# Patient Record
Sex: FEMALE | Race: WHITE | Employment: FULL TIME | URBAN - METROPOLITAN AREA
[De-identification: names, ages, dates, MRNs, and addresses within clinical notes are randomized per-mention and may not be internally consistent; named-entity substitution may affect disease eponyms.]

---

## 2017-12-26 PROBLEM — E87.6 HYPOKALEMIA: Status: ACTIVE | Noted: 2017-12-26

## 2017-12-27 PROBLEM — R07.81 RIB PAIN ON RIGHT SIDE: Status: ACTIVE | Noted: 2017-12-27

## 2017-12-27 PROBLEM — R07.9 CHEST PAIN: Status: ACTIVE | Noted: 2017-12-27

## 2018-01-03 ENCOUNTER — HOSPITAL ENCOUNTER (OUTPATIENT)
Dept: OTHER | Age: 43
Discharge: HOME | End: 2018-01-03
Attending: FAMILY MEDICINE | Admitting: FAMILY MEDICINE

## 2018-01-05 PROBLEM — K52.9 COLITIS: Status: ACTIVE | Noted: 2018-01-05

## 2018-01-11 LAB
ACQUISITION DURATION: NORMAL S
AVERAGE HEART RATE: 74 BPM
EKG DIAGNOSIS: NORMAL
HOLTER MAX HEART RATE: 133 BPM
HOOKUP DATE: NORMAL
HOOKUP TIME: NORMAL
Lab: NORMAL
MAX HEART RATE TIME/DATE: NORMAL
MIN HEART RATE TIME/DATE: NORMAL
MIN HEART RATE: 60 BPM
NUMBER OF QRS COMPLEXES: NORMAL
NUMBER OF SUPRAVENTRICULAR BEATS IN RUNS: 0
NUMBER OF SUPRAVENTRICULAR COUPLETS: 0
NUMBER OF SUPRAVENTRICULAR ECTOPICS: 0
NUMBER OF SUPRAVENTRICULAR ISOLATED BEATS: 0
NUMBER OF SUPRAVENTRICULAR RUNS: 0
NUMBER OF VENTRICULAR BEATS IN RUNS: 0
NUMBER OF VENTRICULAR BIGEMINAL CYCLES: 0
NUMBER OF VENTRICULAR COUPLETS: 0
NUMBER OF VENTRICULAR ECTOPICS: 0
NUMBER OF VENTRICULAR ISOLATED BEATS: 0
NUMBER OF VENTRICULAR RUNS: 0

## 2019-06-27 ENCOUNTER — HOSPITAL ENCOUNTER (OUTPATIENT)
Age: 44
Discharge: HOME | End: 2019-06-27

## 2019-06-27 ENCOUNTER — HOSPITAL ENCOUNTER (OUTPATIENT)
Dept: GENERAL RADIOLOGY | Age: 44
Discharge: HOME | End: 2019-06-27

## 2019-06-27 DIAGNOSIS — R07.9 CHEST PAIN, UNSPECIFIED TYPE: ICD-10-CM

## 2019-06-27 PROCEDURE — 71046 X-RAY EXAM CHEST 2 VIEWS: CPT

## 2019-10-20 ENCOUNTER — APPOINTMENT (OUTPATIENT)
Dept: GENERAL RADIOLOGY | Age: 44
End: 2019-10-20
Payer: COMMERCIAL

## 2019-10-20 ENCOUNTER — HOSPITAL ENCOUNTER (EMERGENCY)
Age: 44
Discharge: HOME | End: 2019-10-20
Payer: COMMERCIAL

## 2019-10-20 VITALS
HEIGHT: 63 IN | RESPIRATION RATE: 16 BRPM | OXYGEN SATURATION: 96 % | WEIGHT: 220 LBS | TEMPERATURE: 98.1 F | SYSTOLIC BLOOD PRESSURE: 132 MMHG | HEART RATE: 82 BPM | DIASTOLIC BLOOD PRESSURE: 64 MMHG | BODY MASS INDEX: 38.98 KG/M2

## 2019-10-20 DIAGNOSIS — S76.912A MUSCLE STRAIN OF LEFT THIGH, INITIAL ENCOUNTER: ICD-10-CM

## 2019-10-20 DIAGNOSIS — M25.552 LEFT HIP PAIN: Primary | ICD-10-CM

## 2019-10-20 PROCEDURE — 99283 EMERGENCY DEPT VISIT LOW MDM: CPT

## 2019-10-20 PROCEDURE — 73552 X-RAY EXAM OF FEMUR 2/>: CPT

## 2019-10-20 PROCEDURE — 73502 X-RAY EXAM HIP UNI 2-3 VIEWS: CPT

## 2019-10-20 RX ORDER — METHOCARBAMOL 500 MG/1
500 TABLET, FILM COATED ORAL 3 TIMES DAILY PRN
Qty: 20 TABLET | Refills: 0 | Status: SHIPPED | OUTPATIENT
Start: 2019-10-20 | End: 2019-10-30

## 2019-10-20 ASSESSMENT — PAIN DESCRIPTION - LOCATION
LOCATION: HIP
LOCATION: HIP

## 2019-10-20 ASSESSMENT — PAIN DESCRIPTION - ORIENTATION
ORIENTATION: LEFT
ORIENTATION: LEFT

## 2020-06-12 ENCOUNTER — OFFICE VISIT (OUTPATIENT)
Dept: ORTHOPEDIC SURGERY | Age: 45
End: 2020-06-12
Payer: COMMERCIAL

## 2020-06-12 VITALS — HEIGHT: 63 IN | RESPIRATION RATE: 16 BRPM | WEIGHT: 220 LBS | BODY MASS INDEX: 38.98 KG/M2

## 2020-06-12 PROBLEM — R20.0 NUMBNESS IN BOTH HANDS: Status: ACTIVE | Noted: 2020-06-12

## 2020-06-12 PROCEDURE — L3908 WHO COCK-UP NONMOLDE PRE OTS: HCPCS | Performed by: ORTHOPAEDIC SURGERY

## 2020-06-12 PROCEDURE — 99203 OFFICE O/P NEW LOW 30 MIN: CPT | Performed by: ORTHOPAEDIC SURGERY

## 2020-06-12 RX ORDER — DEXTROAMPHETAMINE SACCHARATE, AMPHETAMINE ASPARTATE, DEXTROAMPHETAMINE SULFATE AND AMPHETAMINE SULFATE 1.25; 1.25; 1.25; 1.25 MG/1; MG/1; MG/1; MG/1
25 TABLET ORAL
COMMUNITY
Start: 2020-05-12

## 2020-06-12 RX ORDER — PREDNISONE 20 MG/1
TABLET ORAL
COMMUNITY
Start: 2020-06-01 | End: 2022-07-03

## 2020-06-12 RX ORDER — LORAZEPAM 1 MG/1
TABLET ORAL
COMMUNITY
Start: 2020-03-17 | End: 2022-07-03

## 2020-06-12 NOTE — PROGRESS NOTES
HISTORY OF PRESENT ILLNESS: The patient is a 79-year-old right-hand-dominant female who presents today for a new hand surgery specialty evaluation regarding her right hand. She has noticed over the last year increasing episodes of numbness and tingling into the right thumb index and long fingers especially at nighttime. More recently this is been radiating up to the elbow and giving her a feeling of weakness. In addition more recently she has started to notice some early symptoms on the left side. She has been awakening at nighttime and trying a simple soft wrist wrap. She denies any specific neck pain. She states that occasionally she will feel some tingling into the right small finger. PAST MEDICAL HISTORY: Patient's medications, allergies, past medical, surgical, social and family histories were reviewed and updated as appropriate. ROS: Pertinent items are noted in HPI. Review of systems reviewed from patient history form dated on 6/18/20 and available in the patient's chart under the media tab. Denies fever, chills, confusion, bowel/bladder active change. PHYSICAL EXAMINATION: Examination reveals a pleasant individual in no acute distress. There appears to be satisfactory pain-free range of motion, strength, and stability of the cervical spine, shoulders, and elbows. Skin is intact without lymphadenopathy, discoloration, or abnormal temperature. There is intact, symmetric circulation in both upper extremities. Wrist, hand, and digital range of motion is satisfactory bilaterally. Provocative testing for cubital tunnel syndrome appears to be slightly positive on the right side but without the same discomfort as the wrist.    Provocative testing for bilateral carpal tunnel syndrome suggests reproduction of symptoms with direct compression, Phalen's, and Tinel's.   Symptoms are much more noted on the right side but also slightly positive on the left there is no sign of circulatory dysfunction or atrophy. DIAGNOSTIC TESTING:        IMPRESSION AND PLAN: Bilateral hand numbness, worse on the right, with strong suspicion of carpal tunnel syndrome but an outside concern for a possible element of ulnar neuropathy as well. We will empirically start her on carpal tunnel bracing at nighttime and provide her with at least a right carpal tunnel brace. In addition we will get her set up for EMG testing of both upper extremities. I will see her back after the testing to review the response to bracing and discuss the findings of the EMG. Please note that this transcription was created using voice recognition software. Any errors are unintentional and may be due to voice recognition transcription. Procedures    Shi Marcos Gel Wrist Brace     Patient was prescribed a Shi Marcos Gel Wrist Brace. The right wrist will require stabilization / immobilization from this semi-rigid / rigid orthosis to improve their function. The orthosis will assist in protecting the affected area, provide functional support and facilitate healing. The patient was educated and fit by a healthcare professional with expert knowledge and specialization in brace application while under the direct supervision of the treating physician. Verbal and written instructions for the use of and application of this item were provided. They were instructed to contact the office immediately should the brace result in increased pain, decreased sensation, increased swelling or worsening of the condition.  Who cock-up nonmolde pre ots     Patient was prescribed a Shi Marcos Gel Wrist Brace. The left wrist will require stabilization / immobilization from this semi-rigid / rigid orthosis to improve their function. The orthosis will assist in protecting the affected area, provide functional support and facilitate healing.     The patient was educated and fit by a healthcare professional with expert knowledge and specialization

## 2020-07-02 ENCOUNTER — OFFICE VISIT (OUTPATIENT)
Dept: ORTHOPEDIC SURGERY | Age: 45
End: 2020-07-02
Payer: COMMERCIAL

## 2020-07-02 VITALS — WEIGHT: 220 LBS | HEIGHT: 63 IN | BODY MASS INDEX: 38.98 KG/M2

## 2020-07-02 PROBLEM — G56.03 CARPAL TUNNEL SYNDROME, BILATERAL: Status: ACTIVE | Noted: 2020-07-02

## 2020-07-02 PROCEDURE — 99214 OFFICE O/P EST MOD 30 MIN: CPT | Performed by: ORTHOPAEDIC SURGERY

## 2020-07-02 PROCEDURE — 95886 MUSC TEST DONE W/N TEST COMP: CPT | Performed by: PHYSICAL MEDICINE & REHABILITATION

## 2020-07-02 PROCEDURE — 95910 NRV CNDJ TEST 7-8 STUDIES: CPT | Performed by: PHYSICAL MEDICINE & REHABILITATION

## 2020-07-02 NOTE — PROGRESS NOTES
discussed the risks, benefits, limitations, alternatives, and postop recovery following the proposed procedure. We will begin the process of scheduling surgery if there are no other preoperative medical contraindications. Please note that this transcription was created using voice recognition software. Any errors are unintentional and may be due to voice recognition transcription.

## 2020-07-02 NOTE — PROGRESS NOTES
[address] [facility], [address]   Ph: [phone]  NCS & Electromyography Report        Full Name: Rony Galvez Gender: Female  Patient ID: [id number] YOB: 1975      Visit Date: 7/2/2020 09:46  Age: 39 Years 11 Months Old  Examining Physician: Dr Kiki Reed  Referring Physician: Dr Jaci Pacheco  Patient History: yovani hand numbness      Sensory NCS      Nerve / Sites Rec. Site Onset Lat Peak Lat PP Amp Segments Distance Peak Diff Velocity     ms ms µV  cm ms m/s   R Median - D2 Ulnar D5      Median Dig II 4.69 5.78 11.9 Median - Dig II 14  30      Ref. ?3.70 ? 20.0 Ref. ?53      Ulnar Dig V 2.76 3.49 38.3 Ulnar - Median 14 -2.29 73      Ref. ?3.50 ? 10.0 Ref. ?53   L Median - D2 Ulnar D5      Median Dig II 3.65 4.79 18.0 Median - Dig II 14  38      Ref. ?3.70 ? 20.0 Ref. ?53      Ulnar Dig V 2.60 3.39 21.7 Ulnar - Median 14 -1.41 134      Ref. ?3.50 ? 10.0 Ref. ?53       Motor NCS      Nerve / Sites Muscle Latency Amplitude Amp % Duration Segments Distance Lat Diff Velocity     ms mV % ms  cm ms m/s   R Median - APB      Wrist APB 4.95 10.2 100 7.19 Wrist - APB 8        Ref. ?4.40 ?4.0   Ref. Elbow APB 9.43 9.0 88.5 7.66 Elbow - Wrist 23 4.48 51      Ref. Ref.   ?49   L Median - APB      Wrist APB 4.84 12.8 100 6.61 Wrist - APB 8        Ref. ?4.40 ?4.0   Ref. Elbow APB 8.96 12.3 96.4 6.51 Elbow - Wrist 22 4.11 53      Ref. Ref.   ?49   R Ulnar - ADM      Wrist ADM 2.81 6.3 100 7.60 Wrist - ADM 8        Ref. ?3.60 ?5.0   Ref. B. Elbow ADM 5.83 6.9 110 7.40 B. Elbow - Wrist 20 3.02 66      Ref. Ref.   ?49      A. Elbow ADM 7.50 7.1 112 7.60 A. Elbow - B. Elbow 10 1.67 60      Ref. Ref.   ?49   L Ulnar - ADM      Wrist ADM 2.86 8.4 100 6.87 Wrist - ADM 8        Ref. ?3.60 ?5.0   Ref. B. Elbow ADM 5.89 8.6 101 7.08 B. Elbow - Wrist 20 3.02 66      Ref. Ref.   ?49      A. Elbow ADM 7.24 8.7 103 7.14 A. Elbow - B. Elbow 10 1.35 74      Ref. Ref.   ?49       EMG Summary Table     Spontaneous MUAP Recruitment   Muscle Nerve Roots IA Fib PSW Fasc H.F. Amp Dur. PPP Pattern   R. First dorsal interosseous Ulnar C8-T1 N None None None None N N N N   R. Abductor pollicis brevis Median C1-I9 N None None None None 1+ 2+ 1+ N   L. Abductor pollicis brevis Median K9-X1 N None None None None 1+ 1+ N N   L. First dorsal interosseous Ulnar C8-T1 N None None None None N N N N   L. Triceps brachii Radial C6-C8 N None None None None N N N N   R. Triceps brachii Radial C6-C8 N None None None None N N N N   L. Biceps brachii Musculocutaneous C5-C6 N None None None None N N N N   R. Biceps brachii Musculocutaneous C5-C6 N None None None None N N N N   L. Pronator teres Median C6-C7 N None None None None N N N N   R. Pronator teres Median C6-C7 N None None None None N N N N   L. Extensor digitorum communis Radial C7-C8 N None None None None N N N N   R. Extensor digitorum communis Radial C7-C8 N None None None None N N N N         Summary: Bilateral median SNAP and CMAP latencies are slowed across the wrists with low bilateral sensory amplitudes. Monopolar exam reveals chronic neurogenic motor unit morphology isolated to bilateral APB muscles. The remainder of the NCS and monopolar exam are normal.        Conclusion:  Abnormal examination. There is electrodiagnostic evidence of:    1. Moderate chronic bilateral carpal tunnel syndrome  2.  No evidence of any other left or right upper extremity mononeuropathy, plexopathy, or radiculopathy              Talat Bahena MD    Board Certified Physical Medicine and Rehabilitation

## 2020-08-17 ENCOUNTER — TELEPHONE (OUTPATIENT)
Dept: ORTHOPEDIC SURGERY | Age: 45
End: 2020-08-17

## 2022-06-08 ENCOUNTER — HOSPITAL ENCOUNTER (OUTPATIENT)
Dept: CT IMAGING | Age: 47
Discharge: HOME | End: 2022-06-08

## 2022-06-08 DIAGNOSIS — S22.41XG CLOSED FRACTURE OF MULTIPLE RIBS OF RIGHT SIDE WITH DELAYED HEALING, SUBSEQUENT ENCOUNTER: ICD-10-CM

## 2022-06-08 PROCEDURE — 71250 CT THORAX DX C-: CPT

## 2022-06-27 ENCOUNTER — TELEPHONE (OUTPATIENT)
Dept: ORTHOPEDIC SURGERY | Age: 47
End: 2022-06-27

## 2022-07-03 ENCOUNTER — APPOINTMENT (OUTPATIENT)
Dept: GENERAL RADIOLOGY | Age: 47
End: 2022-07-03

## 2022-07-03 ENCOUNTER — HOSPITAL ENCOUNTER (EMERGENCY)
Age: 47
Discharge: HOME | End: 2022-07-03
Attending: EMERGENCY MEDICINE

## 2022-07-03 VITALS
RESPIRATION RATE: 14 BRPM | BODY MASS INDEX: 38.09 KG/M2 | WEIGHT: 215 LBS | DIASTOLIC BLOOD PRESSURE: 89 MMHG | HEIGHT: 63 IN | OXYGEN SATURATION: 100 % | SYSTOLIC BLOOD PRESSURE: 172 MMHG | TEMPERATURE: 98 F | HEART RATE: 98 BPM

## 2022-07-03 DIAGNOSIS — S22.41XK: ICD-10-CM

## 2022-07-03 DIAGNOSIS — R07.81 RIB PAIN: Primary | ICD-10-CM

## 2022-07-03 DIAGNOSIS — R03.0 ELEVATED BLOOD PRESSURE READING: ICD-10-CM

## 2022-07-03 PROCEDURE — 99284 EMERGENCY DEPT VISIT MOD MDM: CPT

## 2022-07-03 PROCEDURE — 96372 THER/PROPH/DIAG INJ SC/IM: CPT

## 2022-07-03 PROCEDURE — 71101 X-RAY EXAM UNILAT RIBS/CHEST: CPT

## 2022-07-03 PROCEDURE — 6360000002 HC RX W HCPCS: Performed by: EMERGENCY MEDICINE

## 2022-07-03 RX ORDER — DEXAMETHASONE SODIUM PHOSPHATE 10 MG/ML
10 INJECTION, SOLUTION INTRAMUSCULAR; INTRAVENOUS ONCE
Status: COMPLETED | OUTPATIENT
Start: 2022-07-03 | End: 2022-07-03

## 2022-07-03 RX ORDER — KETOROLAC TROMETHAMINE 30 MG/ML
15 INJECTION, SOLUTION INTRAMUSCULAR; INTRAVENOUS ONCE
Status: COMPLETED | OUTPATIENT
Start: 2022-07-03 | End: 2022-07-03

## 2022-07-03 RX ORDER — SULINDAC 200 MG/1
200 TABLET ORAL 2 TIMES DAILY
COMMUNITY

## 2022-07-03 RX ORDER — HYDROXYCHLOROQUINE SULFATE 200 MG/1
TABLET, FILM COATED ORAL DAILY
COMMUNITY

## 2022-07-03 RX ADMIN — KETOROLAC TROMETHAMINE 15 MG: 30 INJECTION, SOLUTION INTRAMUSCULAR; INTRAVENOUS at 22:20

## 2022-07-03 RX ADMIN — DEXAMETHASONE SODIUM PHOSPHATE 10 MG: 10 INJECTION, SOLUTION INTRAMUSCULAR; INTRAVENOUS at 22:20

## 2022-07-03 ASSESSMENT — PAIN DESCRIPTION - PAIN TYPE: TYPE: ACUTE PAIN;CHRONIC PAIN

## 2022-07-03 ASSESSMENT — PAIN SCALES - GENERAL
PAINLEVEL_OUTOF10: 7
PAINLEVEL_OUTOF10: 5
PAINLEVEL_OUTOF10: 6

## 2022-07-03 ASSESSMENT — PAIN - FUNCTIONAL ASSESSMENT
PAIN_FUNCTIONAL_ASSESSMENT: PREVENTS OR INTERFERES SOME ACTIVE ACTIVITIES AND ADLS
PAIN_FUNCTIONAL_ASSESSMENT: 0-10
PAIN_FUNCTIONAL_ASSESSMENT: 0-10

## 2022-07-03 ASSESSMENT — PAIN DESCRIPTION - DESCRIPTORS: DESCRIPTORS: SHARP

## 2022-07-03 ASSESSMENT — PAIN DESCRIPTION - ORIENTATION: ORIENTATION: RIGHT;LEFT

## 2022-07-03 ASSESSMENT — PAIN DESCRIPTION - FREQUENCY: FREQUENCY: CONTINUOUS

## 2022-07-03 ASSESSMENT — PAIN DESCRIPTION - LOCATION: LOCATION: RIB CAGE

## 2022-07-04 NOTE — ED TRIAGE NOTES
Chronic rib pain pt has been seen and treated for last week, will have f/u with ortho surgeon soon. C/o bilateral pain making it hard to take deep breaths.

## 2022-07-04 NOTE — ED PROVIDER NOTES
Emergency Department Physician Note     Location: [address] EMERGENCY DEPARTMENT  7/3/2022    CHIEF COMPLAINT  Rib Pain      HISTORY OF PRESENT ILLNESS  Trisha Mosqueda is a 52 y.o. female presents to the ED with rib pain, typically has right-sided rib pain ever since an injury last year, told she had rib fractures about 6 months later, but was unaware, has been having issues ever since, painful with cough or breathing, now the pain is radiating around to the left side as well, feels like it is a tight band around her rib cage, was seen and evaluated for this last week, there is supposed to have her follow-up with Ortho, but they are trying to get her in with the right doctor since Dr. Blaine Nguyen would not manage nonhealing rib fractures like this, no rashes nor flank/abdominal pain. Declines concern for pregnancy, no nausea/vomiting, no bowel or bladder changes, no hematuria, no significant cough/productive sputum, no fever, no known sick contacts, she did recently get diagnosed with RA, has been started on Plaquenil and sulindac, she had seen a chiropractor for this as well without much relief, no other complaints, modifying factors or associated symptoms. I have reviewed the following from the nursing documentation. Past Medical History:   Diagnosis Date    Anxiety     Fibromyalgia     Headache(784.0)     Hypertension      Past Surgical History:   Procedure Laterality Date     SECTION      OTHER SURGICAL HISTORY Bilateral 10/12/2016    removal bilateral PE tubes with bilateral paper patch     TONSILLECTOMY      TUBAL LIGATION      TYMPANOSTOMY TUBE PLACEMENT      Tubes still in ears, put in when 8 yrs old approx.      Family History   Problem Relation Age of Onset    High Blood Pressure Father     Heart Disease Father     Asthma Father      Social History     Socioeconomic History    Marital status:      Spouse name: Not on file    Number of children: Not on file    Years of education: Not on file    Highest education level: Not on file   Occupational History    Not on file   Tobacco Use    Smoking status: Never Smoker    Smokeless tobacco: Never Used   Substance and Sexual Activity    Alcohol use: No    Drug use: No    Sexual activity: Yes     Partners: Male   Other Topics Concern    Not on file   Social History Narrative    Not on file     Social Determinants of Health     Financial Resource Strain:     Difficulty of Paying Living Expenses: Not on file   Food Insecurity:     Worried About Running Out of Food in the Last Year: Not on file    Sandrine of Food in the Last Year: Not on file   Transportation Needs:     Lack of Transportation (Medical): Not on file    Lack of Transportation (Non-Medical): Not on file   Physical Activity:     Days of Exercise per Week: Not on file    Minutes of Exercise per Session: Not on file   Stress:     Feeling of Stress : Not on file   Social Connections:     Frequency of Communication with Friends and Family: Not on file    Frequency of Social Gatherings with Friends and Family: Not on file    Attends Jain Services: Not on file    Active Member of [address] [place] [address] or Organizations: Not on file    Attends Club or Organization Meetings: Not on file    Marital Status: Not on file   Intimate Partner Violence:     Fear of Current or Ex-Partner: Not on file    Emotionally Abused: Not on file    Physically Abused: Not on file    Sexually Abused: Not on file   Housing Stability:     Unable to Pay for Housing in the Last Year: Not on file    Number of [place] in the Last Year: Not on file    Unstable Housing in the Last Year: Not on file     No current facility-administered medications for this encounter.      Current Outpatient Medications   Medication Sig Dispense Refill    hydroxychloroquine (PLAQUENIL) 200 MG tablet Take by mouth daily      sulindac (CLINORIL) 200 MG tablet Take 200 mg by mouth 2 times daily      CHEST (MIN 3 VIEWS)    Result Date: 7/3/2022  EXAMINATION: 2 XRAY VIEWS OF THE RIGHT RIBS WITH FRONTAL XRAY VIEW OF THE CHEST 7/3/2022 10:21 pm COMPARISON: CT on 06/08/2022 HISTORY: Acute right anterior rib pain and shortness of breath. Right rib fractures on CT last month. FINDINGS: Cardiomediastinal silhouette is borderline enlarged. Lungs are clear. No pleural effusion or pneumothorax. Again noted are fractures of the right 3rd through 6th ribs as seen on most recent CT. No new fracture seen. Subacute to chronic fractures of the right 3rd through 6th ribs as seen on most recent CT. No acute fracture seen. ED COURSE/MDM  Patient seen and evaluated. Old records reviewed. Labs and imaging reviewed and results discussed with patient.      52 y.o. 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, she states she had been trying to get in with an orthopedic doctor, but I explained this would not likely be the best option for her problem, potentially trauma surgery or cardiothoracic surgery, given information on discharge instructions, she had no pneumothorax/hemothorax, no acute process on x-rays today, she just had a CT scan recently so I did not feel it necessary to repeat for the same type of pain, felt better after Toradol/Decadron here, explained she may have an aspect of pleurisy as well, but after reviewing the imaging with him this is most likely bony/rib related, explained she may end up needing surgery though it is not common to have this performed, however since these are ununited for a prolonged period and causing pain, it may be of benefit, she has medications at home for pain including her sulindac and previously prescribed Norco, did not request anything else, strict return precautions given, all questions answered, will return if any worsening symptoms or new concerns, see AVS for further discharge information, patient verbalized understanding of plan, felt comfortable going home. Orders Placed This Encounter   Procedures    XR RIBS RIGHT INCLUDE CHEST (MIN 3 VIEWS)     Orders Placed This Encounter   Medications    ketorolac (TORADOL) injection 15 mg    dexamethasone (PF) (DECADRON) injection 10 mg     ED Course as of 07/04/22 0429   Sun Jul 03, 2022   5676 She is feeling a little better. [SY]      ED Course User Index  [SY] Constanza Valladares DO         CLINICAL IMPRESSION  1. Rib pain    2. Multiple fractures of ribs, right side, subsequent encounter for fracture with nonunion    3. Elevated blood pressure reading        Blood pressure (!) 172/89, pulse 98, temperature 98 °F (36.7 °C), temperature source Oral, resp. rate 14, height 5' 3\" (1.6 m), weight 215 lb (97.5 kg), SpO2 100 %, not currently breastfeeding. DISPOSITION  Mariana Gonzalez was discharged to home in stable condition.                 Constanza Valladares DO  07/06/22 9001

## 2022-12-05 ENCOUNTER — APPOINTMENT (OUTPATIENT)
Dept: CT IMAGING | Age: 47
End: 2022-12-05

## 2022-12-05 ENCOUNTER — HOSPITAL ENCOUNTER (EMERGENCY)
Age: 47
Discharge: HOME | End: 2022-12-05
Attending: EMERGENCY MEDICINE

## 2022-12-05 ENCOUNTER — APPOINTMENT (OUTPATIENT)
Dept: GENERAL RADIOLOGY | Age: 47
End: 2022-12-05

## 2022-12-05 VITALS
DIASTOLIC BLOOD PRESSURE: 94 MMHG | HEIGHT: 63 IN | RESPIRATION RATE: 16 BRPM | SYSTOLIC BLOOD PRESSURE: 170 MMHG | HEART RATE: 81 BPM | OXYGEN SATURATION: 91 % | WEIGHT: 215 LBS | TEMPERATURE: 96.2 F | BODY MASS INDEX: 38.09 KG/M2

## 2022-12-05 DIAGNOSIS — M06.9 RHEUMATOID ARTHRITIS FLARE (HCC): ICD-10-CM

## 2022-12-05 DIAGNOSIS — D84.9 IMMUNOCOMPROMISED STATE (HCC): Primary | ICD-10-CM

## 2022-12-05 DIAGNOSIS — R06.89 DYSPNEA AND RESPIRATORY ABNORMALITIES: ICD-10-CM

## 2022-12-05 DIAGNOSIS — R06.00 DYSPNEA AND RESPIRATORY ABNORMALITIES: ICD-10-CM

## 2022-12-05 LAB
A/G RATIO: 1.5 (ref 1.1–2.2)
ALBUMIN SERPL-MCNC: 4.6 G/DL (ref 3.4–5)
ALP BLD-CCNC: 95 U/L (ref 40–129)
ALT SERPL-CCNC: 19 U/L (ref 10–40)
ANION GAP SERPL CALCULATED.3IONS-SCNC: 12 MMOL/L (ref 3–16)
AST SERPL-CCNC: 14 U/L (ref 15–37)
BASOPHILS ABSOLUTE: 0 K/UL (ref 0–0.2)
BASOPHILS RELATIVE PERCENT: 0.4 %
BILIRUB SERPL-MCNC: 0.3 MG/DL (ref 0–1)
BUN BLDV-MCNC: 13 MG/DL (ref 7–20)
C-REACTIVE PROTEIN: <3 MG/L (ref 0–5.1)
CALCIUM SERPL-MCNC: 10 MG/DL (ref 8.3–10.6)
CHLORIDE BLD-SCNC: 101 MMOL/L (ref 99–110)
CO2: 28 MMOL/L (ref 21–32)
CREAT SERPL-MCNC: 0.7 MG/DL (ref 0.6–1.1)
D DIMER: 1.58 UG/ML FEU (ref 0–0.6)
EKG ATRIAL RATE: 84 BPM
EKG DIAGNOSIS: NORMAL
EKG P AXIS: 70 DEGREES
EKG P-R INTERVAL: 150 MS
EKG Q-T INTERVAL: 386 MS
EKG QRS DURATION: 78 MS
EKG QTC CALCULATION (BAZETT): 456 MS
EKG R AXIS: 42 DEGREES
EKG T AXIS: 61 DEGREES
EKG VENTRICULAR RATE: 84 BPM
EOSINOPHILS ABSOLUTE: 0 K/UL (ref 0–0.6)
EOSINOPHILS RELATIVE PERCENT: 0.5 %
GFR SERPL CREATININE-BSD FRML MDRD: >60 ML/MIN/{1.73_M2}
GLUCOSE BLD-MCNC: 99 MG/DL (ref 70–99)
HCT VFR BLD CALC: 42.9 % (ref 36–48)
HEMOGLOBIN: 14.5 G/DL (ref 12–16)
INR BLD: 0.94 (ref 0.87–1.14)
LACTIC ACID: 2.7 MMOL/L (ref 0.4–2)
LYMPHOCYTES ABSOLUTE: 1.3 K/UL (ref 1–5.1)
LYMPHOCYTES RELATIVE PERCENT: 15.4 %
MCH RBC QN AUTO: 30.2 PG (ref 26–34)
MCHC RBC AUTO-ENTMCNC: 33.8 G/DL (ref 31–36)
MCV RBC AUTO: 89.3 FL (ref 80–100)
MONOCYTES ABSOLUTE: 0.3 K/UL (ref 0–1.3)
MONOCYTES RELATIVE PERCENT: 4 %
NEUTROPHILS ABSOLUTE: 6.8 K/UL (ref 1.7–7.7)
NEUTROPHILS RELATIVE PERCENT: 79.7 %
PDW BLD-RTO: 13.2 % (ref 12.4–15.4)
PLATELET # BLD: 334 K/UL (ref 135–450)
PMV BLD AUTO: 7.8 FL (ref 5–10.5)
POTASSIUM SERPL-SCNC: 4.1 MMOL/L (ref 3.5–5.1)
PRO-BNP: 59 PG/ML (ref 0–124)
PROTHROMBIN TIME: 12.4 SEC (ref 11.7–14.5)
RAPID INFLUENZA  B AGN: NEGATIVE
RAPID INFLUENZA A AGN: NEGATIVE
RBC # BLD: 4.8 M/UL (ref 4–5.2)
SARS-COV-2, NAAT: NOT DETECTED
SODIUM BLD-SCNC: 141 MMOL/L (ref 136–145)
TOTAL PROTEIN: 7.6 G/DL (ref 6.4–8.2)
TROPONIN: <0.01 NG/ML
WBC # BLD: 8.6 K/UL (ref 4–11)

## 2022-12-05 PROCEDURE — 85379 FIBRIN DEGRADATION QUANT: CPT

## 2022-12-05 PROCEDURE — 93005 ELECTROCARDIOGRAM TRACING: CPT | Performed by: EMERGENCY MEDICINE

## 2022-12-05 PROCEDURE — 86140 C-REACTIVE PROTEIN: CPT

## 2022-12-05 PROCEDURE — 80053 COMPREHEN METABOLIC PANEL: CPT

## 2022-12-05 PROCEDURE — 87635 SARS-COV-2 COVID-19 AMP PRB: CPT

## 2022-12-05 PROCEDURE — 85610 PROTHROMBIN TIME: CPT

## 2022-12-05 PROCEDURE — 99285 EMERGENCY DEPT VISIT HI MDM: CPT

## 2022-12-05 PROCEDURE — 83605 ASSAY OF LACTIC ACID: CPT

## 2022-12-05 PROCEDURE — 71046 X-RAY EXAM CHEST 2 VIEWS: CPT

## 2022-12-05 PROCEDURE — 85025 COMPLETE CBC W/AUTO DIFF WBC: CPT

## 2022-12-05 PROCEDURE — 36415 COLL VENOUS BLD VENIPUNCTURE: CPT

## 2022-12-05 PROCEDURE — 83880 ASSAY OF NATRIURETIC PEPTIDE: CPT

## 2022-12-05 PROCEDURE — 71260 CT THORAX DX C+: CPT | Performed by: EMERGENCY MEDICINE

## 2022-12-05 PROCEDURE — 6360000004 HC RX CONTRAST MEDICATION: Performed by: EMERGENCY MEDICINE

## 2022-12-05 PROCEDURE — 87804 INFLUENZA ASSAY W/OPTIC: CPT

## 2022-12-05 PROCEDURE — 84484 ASSAY OF TROPONIN QUANT: CPT

## 2022-12-05 RX ORDER — AMOXICILLIN AND CLAVULANATE POTASSIUM 875; 125 MG/1; MG/1
1 TABLET, FILM COATED ORAL EVERY 12 HOURS
COMMUNITY
Start: 2022-12-01 | End: 2022-12-11

## 2022-12-05 RX ORDER — PREDNISONE 20 MG/1
TABLET ORAL
COMMUNITY
Start: 2022-12-01

## 2022-12-05 RX ORDER — METHOCARBAMOL 500 MG/1
500 TABLET, FILM COATED ORAL 4 TIMES DAILY
Qty: 20 TABLET | Refills: 0 | Status: SHIPPED | OUTPATIENT
Start: 2022-12-05 | End: 2022-12-10

## 2022-12-05 RX ADMIN — IOPAMIDOL 75 ML: 755 INJECTION, SOLUTION INTRAVENOUS at 10:07

## 2022-12-05 ASSESSMENT — ENCOUNTER SYMPTOMS
STRIDOR: 0
SINUS PAIN: 0
SINUS PRESSURE: 0
TROUBLE SWALLOWING: 0
WHEEZING: 0
SORE THROAT: 0
SHORTNESS OF BREATH: 1
COUGH: 1
CHOKING: 0
CHEST TIGHTNESS: 1

## 2022-12-05 NOTE — DISCHARGE INSTRUCTIONS
Add acetaminophen 650 mg every 4 hours for pain  Take Robaxin 3 times a day to relax the muscles in your back  Finish the Augmentin which she was started  Finish the prednisone tapering dose  Take your anti-inflammatories  Follow-up with both of your doctors soon

## 2022-12-05 NOTE — ED PROVIDER NOTES
[address] [facility]      Pt Name: Tangela Riley  MRN: [id number]  [place] 1975  Date of evaluation: 12/5/2022  Provider: Camron Garcia MD    [address] [place] [address]       Chief Complaint   Patient presents with    Rib Pain (injury)     Patient reports that she has been fighting a respiratory virus since last Saturday. Pt. States that she is now having bilateral rib pain/back pain that causes pain with breathing. HISTORY OF PRESENT ILLNESS   (Location/Symptom, Timing/Onset, Context/Setting, Quality, Duration, Modifying Factors, Severity)  Note limiting factors. Tangela Riley is a 52 y.o. female who presents to the emergency department     It presents with some bilateral back discomfort. Patient says that she does have exertional dyspnea she does suffer from anxiety fibromyalgia as she has had some apparently rib fractures on the right side that she thinks was due to some minor trauma they have showed up on the last several imaging studies that she has had including a CT she did have a CT angio for PE 1 year ago at [facility]. That presentation was reviewed. Patient was seen on Monday at urgent care and was given Augmentin and prednisone for presumptive cough congestion. She continues to have pain across the lower back on both the left and the right side her rib fractures were on the right side this was 2 or 3 years ago apparently  She does have rheumatoid arthritis and is on plaque canal as well as anti-inflammatories. Patient has not had any lung involvement as of yet that we are aware of. She says that her cough is dry it is nonproductive she denies any severe fever but does endorse that 2 weeks ago she had flulike symptoms as well as the other members of her house. She felt as though she is never really fully recovered. She has had COVID-19 every other month she says. Patient    The history is provided by the patient.      Nursing Notes were reviewed. REVIEW OF SYSTEMS    (2-9 systems for level 4, 10 or more for level 5)     Review of Systems   Constitutional:  Positive for activity change, appetite change and chills. Negative for diaphoresis, fatigue and fever. HENT:  Negative for congestion, hearing loss, sinus pressure, sinus pain, sneezing, sore throat and trouble swallowing. Eyes:  Negative for visual disturbance. Respiratory:  Positive for cough, chest tightness and shortness of breath. Negative for choking, wheezing and stridor. Cardiovascular:  Negative for chest pain, palpitations and leg swelling. Genitourinary:  Negative for decreased urine volume, flank pain, hematuria and urgency. Musculoskeletal:  Negative for arthralgias. Allergic/Immunologic: Positive for immunocompromised state. Neurological:  Negative for dizziness. All other systems reviewed and are negative. Except as noted above the remainder of the review of systems was reviewed and negative. PAST MEDICAL HISTORY     Past Medical History:   Diagnosis Date    Anxiety     Fibromyalgia     Headache(784.0)     Hypertension          SURGICAL HISTORY       Past Surgical History:   Procedure Laterality Date     SECTION      OTHER SURGICAL HISTORY Bilateral 10/12/2016    removal bilateral PE tubes with bilateral paper patch     TONSILLECTOMY      TUBAL LIGATION      TYMPANOSTOMY TUBE PLACEMENT      Tubes still in ears, put in when 8 yrs old approx. CURRENT MEDICATIONS       Previous Medications    AMOXICILLIN-CLAVULANATE (AUGMENTIN) 875-125 MG PER TABLET    Take 1 tablet by mouth in the morning and 1 tablet in the evening. AMPHETAMINE-DEXTROAMPHETAMINE (ADDERALL) 5 MG TABLET    Take 25 mg by mouth.      HYDROXYCHLOROQUINE (PLAQUENIL) 200 MG TABLET    Take by mouth daily    IBUPROFEN (ADVIL;MOTRIN) 400 MG TABLET    Take 400 mg by mouth every 6 hours as needed for Pain    PREDNISONE (DELTASONE) 20 MG TABLET    Take 60mg by mouth daily x3 days, then 40mg by mouth daily x3 days, then 20mg by mouth daily x3 days, then 10mg by mouth daily x3 days    SERTRALINE (ZOLOFT) 50 MG TABLET    Take 200 mg by mouth daily     SULINDAC (CLINORIL) 200 MG TABLET    Take 200 mg by mouth 2 times daily       ALLERGIES     Hydroxyzine, Lisinopril, Sumatriptan, Morphine, and Paxil [paroxetine hcl]    FAMILY HISTORY       Family History   Problem Relation Age of Onset    High Blood Pressure Father     Heart Disease Father     Asthma Father           SOCIAL HISTORY       Social History     Socioeconomic History    Marital status:      Spouse name: None    Number of children: None    Years of education: None    Highest education level: None   Tobacco Use    Smoking status: Never    Smokeless tobacco: Never   Vaping Use    Vaping Use: Never used   Substance and Sexual Activity    Alcohol use: No    Drug use: No    Sexual activity: Yes     Partners: Male       SCREENINGS    [place] Coma Scale  Eye Opening: Spontaneous  Best Verbal Response: Oriented  Best Motor Response: Obeys commands  [place] Coma Scale Score: 15          PHYSICAL EXAM    (up to 7 for level 4, 8 or more for level 5)     ED Triage Vitals [12/05/22 0733]   BP Temp Temp Source Heart Rate Resp SpO2 Height Weight   (!) 189/82 (!) 96.2 °F (35.7 °C) Oral 91 18 95 % 5' 3\" (1.6 m) 215 lb (97.5 kg)       Physical Exam  Vitals and nursing note reviewed. Constitutional:       General: She is not in acute distress. Appearance: Normal appearance. She is well-developed. She is ill-appearing. She is not toxic-appearing or diaphoretic. HENT:      Head: Normocephalic. Right Ear: Ear canal and external ear normal.      Left Ear: Ear canal and external ear normal.      Nose: Nose normal.      Mouth/Throat:      Mouth: Mucous membranes are moist.   Eyes:      Conjunctiva/sclera: Conjunctivae normal.      Pupils: Pupils are equal, round, and reactive to light. Neck:      Thyroid: No thyromegaly. Cardiovascular:      Rate and Rhythm: Normal rate and regular rhythm. Heart sounds: Normal heart sounds. No murmur heard. No friction rub. No gallop. Pulmonary:      Effort: Pulmonary effort is normal. No respiratory distress. Breath sounds: Normal breath sounds. No wheezing or rhonchi. Abdominal:      General: Bowel sounds are normal. There is no distension. Palpations: Abdomen is soft. Tenderness: There is no abdominal tenderness. Musculoskeletal:         General: Normal range of motion. Cervical back: Normal range of motion and neck supple. Skin:     General: Skin is warm. Neurological:      Mental Status: She is alert and oriented to person, place, and time. GCS: GCS eye subscore is 4. GCS verbal subscore is 5. GCS motor subscore is 6. Cranial Nerves: No cranial nerve deficit. Sensory: No sensory deficit. Motor: No abnormal muscle tone. Coordination: Coordination normal.      Deep Tendon Reflexes: Reflexes normal.   Psychiatric:         Behavior: Behavior normal.       DIAGNOSTIC RESULTS     EKG: All EKG's are interpreted by the Emergency Department Physician who either signs or Co-signs this chart in the absence of a cardiologist.  84  Rhythm sinus  No acute ST-T wave changes  Intervals are normal.  No ectopy  Normal sinus rhythm      RADIOLOGY:   Non-plain film images such as CT, Ultrasound and MRI are read by the radiologist. Plain radiographic images are visualized and preliminarily interpreted by the emergency physician with the below findings:        Interpretation per the Radiologist below, if available at the time of this note:    CT CHEST PULMONARY EMBOLISM W CONTRAST   Final Result   1. The central pulmonary arteries are adequately opacified, no large or   central pulmonary embolism noted. No acute segmental pulmonary arterial   embolism. The subsegmental/distal pulmonary arteries are poorly opacified with limited   resolution. Therefore, subsegmental pulmonary embolism difficult to exclude   on this examination due to limited resolution. 2. 7 mm new ground-glass nodule located medially right upper lobe of the   lung. See follow-up guidelines. RECOMMENDATIONS:   Pathology: 7 mm right ground-glass pulmonary nodule within the upper lobe. Recommend a non-contrast Chest CT at 6-12 months to confirm persistence, then   additional non-contrast Chest CTs every 2 years until 5 years. If nodule   grows or develops solid component(s), consider resection. XR CHEST (2 VW)   Final Result   No evidence of acute process. Chronic right lateral 3rd through 6th rib fracture deformities.                  LABS:  Results for orders placed or performed during the hospital encounter of 12/05/22   COVID-19, Rapid    Specimen: Nasopharyngeal Swab   Result Value Ref Range    SARS-CoV-2, NAAT Not Detected Not Detected   Rapid influenza A/B antigens    Specimen: Nasopharyngeal   Result Value Ref Range    Rapid Influenza A Ag Negative Negative    Rapid Influenza B Ag Negative Negative   CBC with Auto Differential   Result Value Ref Range    WBC 8.6 4.0 - 11.0 K/uL    RBC 4.80 4.00 - 5.20 M/uL    Hemoglobin 14.5 12.0 - 16.0 g/dL    Hematocrit 42.9 36.0 - 48.0 %    MCV 89.3 80.0 - 100.0 fL    MCH 30.2 26.0 - 34.0 pg    MCHC 33.8 31.0 - 36.0 g/dL    RDW 13.2 12.4 - 15.4 %    Platelets 977 266 - 676 K/uL    MPV 7.8 5.0 - 10.5 fL    Neutrophils % 79.7 %    Lymphocytes % 15.4 %    Monocytes % 4.0 %    Eosinophils % 0.5 %    Basophils % 0.4 %    Neutrophils Absolute 6.8 1.7 - 7.7 K/uL    Lymphocytes Absolute 1.3 1.0 - 5.1 K/uL    Monocytes Absolute 0.3 0.0 - 1.3 K/uL    Eosinophils Absolute 0.0 0.0 - 0.6 K/uL    Basophils Absolute 0.0 0.0 - 0.2 K/uL   Comprehensive Metabolic Panel   Result Value Ref Range    Sodium 141 136 - 145 mmol/L    Potassium 4.1 3.5 - 5.1 mmol/L    Chloride 101 99 - 110 mmol/L    CO2 28 21 - 32 mmol/L    Anion Gap 12 3 - 16 Glucose 99 70 - 99 mg/dL    BUN 13 7 - 20 mg/dL    Creatinine 0.7 0.6 - 1.1 mg/dL    Est, Glom Filt Rate >60 >60    Calcium 10.0 8.3 - 10.6 mg/dL    Total Protein 7.6 6.4 - 8.2 g/dL    Albumin 4.6 3.4 - 5.0 g/dL    Albumin/Globulin Ratio 1.5 1.1 - 2.2    Total Bilirubin 0.3 0.0 - 1.0 mg/dL    Alkaline Phosphatase 95 40 - 129 U/L    ALT 19 10 - 40 U/L    AST 14 (L) 15 - 37 U/L   Protime-INR   Result Value Ref Range    Protime 12.4 11.7 - 14.5 sec    INR 0.94 0.87 - 1.14   Troponin   Result Value Ref Range    Troponin <0.01 <0.01 ng/mL   Brain Natriuretic Peptide   Result Value Ref Range    Pro-BNP 59 0 - 124 pg/mL   Lactic Acid   Result Value Ref Range    Lactic Acid 2.7 (H) 0.4 - 2.0 mmol/L   D-Dimer, Quantitative   Result Value Ref Range    D-Dimer, Quant 1.58 (H) 0.00 - 0.60 ug/mL FEU   EKG 12 Lead   Result Value Ref Range    Ventricular Rate 84 BPM    Atrial Rate 84 BPM    P-R Interval 150 ms    QRS Duration 78 ms    Q-T Interval 386 ms    QTc Calculation (Bazett) 456 ms    P Axis 70 degrees    R Axis 42 degrees    T Axis 61 degrees    Diagnosis       Normal sinus rhythmPossible Left atrial enlargementBorderline ECGWhen compared with ECG of 01-JAN-2018 15:46,No significant change was found            EMERGENCY DEPARTMENT COURSE and DIFFERENTIAL DIAGNOSIS/MDM:     Vitals:    12/05/22 0733   BP: (!) 189/82   Pulse: 91   Resp: 18   Temp: (!) 96.2 °F (35.7 °C)   TempSrc: Oral   SpO2: 95%   Weight: 215 lb (97.5 kg)   Height: 5' 3\" (1.6 m)           MDM  Back discomfort may be just musculoskeletal advised her on increased fluids finish the antibiotics that she was started on.   Vies return if any worsening      REASSESSMENT      Is advised to follow-up with her pulmonary nodules which she already knew about  She is scheduled to be followed up at the first of the year      CRITICAL CARE TIME     CONSULTS:  None      PROCEDURES:     Procedures    MEDICATIONS GIVEN THIS VISIT:  Medications   iopamidol (ISOVUE-370) 76 % injection 75 mL (75 mLs IntraVENous Given 12/5/22 1007)        FINAL IMPRESSION      1. Immunocompromised state ([address]    2. Rheumatoid arthritis flare (HCC)    3. Dyspnea and respiratory abnormalities            DISPOSITION/PLAN   DISPOSITION Decision To Discharge 12/05/2022 08:38:51 AM      PATIENT REFERRED TO:  Umang Amaya MD  [address] [place] [address] Dr Neri [address]  [phone]    Schedule an appointment as soon as possible for a visit       Your rheumatologist    Schedule an appointment as soon as possible for a visit       DISCHARGE MEDICATIONS:  New Prescriptions    METHOCARBAMOL (ROBAXIN) 500 MG TABLET    Take 1 tablet by mouth 4 times daily for 20 doses       Controlled Substances Monitoring  RX Monitoring 1/5/2018   Attestation The Prescription Monitoring Report for this patient was reviewed today. (Please note that portions of this note were completed with a voice recognition program.  Efforts were made to edit the dictations but occasionally words are mis-transcribed.)    Patient was advised to return to the Emergency Department if there was any worsening.     Marbella Nieves MD (electronically signed)  Attending Emergency Physician         Ivis Calderón MD  12/05/22 1461

## 2022-12-05 NOTE — ED NOTES
Educated pt on x1 prescriptions and discharge paperwork as well as follow-up appointment. Pt verbalizes understanding of all instructions and denies questions. IV discontinued, catheter intact, minimal bleeding at IV site, 2x2 and tape applied, manual pressure held. Pt left ambulatory by self with all personal belongings, and discharge paperwork to private residence. Pt in no distress at this time. Prescriptions x1 sent as E-Scripts. Pt instructed on how to  prescriptions. Pt verbalizes understanding.      Geremias Oliveros RN  12/05/22 [id number]

## 2022-12-07 ENCOUNTER — TELEPHONE (OUTPATIENT)
Dept: PULMONOLOGY | Age: 47
End: 2022-12-07

## 2022-12-07 NOTE — TELEPHONE ENCOUNTER
Npt ref by kadi Barrera nodules  Please advise on [email or web address]/5/22    Narrative   EXAMINATION:   CTA OF THE CHEST 12/5/2022 9:58 am       TECHNIQUE:   CTA of the chest was performed after the administration of intravenous   contrast.  Multiplanar reformatted images are provided for review. MIP   images are provided for review. Automated exposure control, iterative   reconstruction, and/or weight based adjustment of the mA/kV was utilized to   reduce the radiation dose to as low as reasonably achievable. COMPARISON:   06/08/2022       HISTORY:   ORDERING SYSTEM PROVIDED HISTORY: Elevated D-dimer   TECHNOLOGIST PROVIDED HISTORY:   Reason for exam:->Elevated D-dimer   Decision Support Exception - unselect if not a suspected or confirmed   emergency medical condition->Emergency Medical Condition (MA)   Reason for Exam: SOB, rib pain with inspiration, elevated d-dimer       FINDINGS:   Pulmonary Arteries: The central pulmonary arteries are adequately opacified, no large or central   pulmonary embolism noted. No acute segmental pulmonary arterial embolism. The subsegmental/distal pulmonary arteries are poorly opacified with limited   resolution. Therefore, subsegmental pulmonary embolism difficult to exclude   on this examination due to limited resolution. .  Main pulmonary artery is   normal in caliber. Mediastinum: No evidence of mediastinal lymphadenopathy. The heart and   pericardium demonstrate no acute abnormality. There is no acute abnormality   of the thoracic aorta. Lungs/pleura: New 7 mm ground-glass nodule is identified medially in the   right upper lobe of the lung image 69 series 3. Previously seen tiny nodule   in the left lower lobe of the lung is not clearly identified. The lungs are without acute process. No focal consolidation or pulmonary   edema. No evidence of pleural effusion or pneumothorax.        Upper Abdomen: Limited images of the upper abdomen are unremarkable. Soft Tissues/Bones: Mild osteopenic changes and degenerative changes   identified in the bony structures. Impression   1. The central pulmonary arteries are adequately opacified, no large or   central pulmonary embolism noted. No acute segmental pulmonary arterial   embolism. The subsegmental/distal pulmonary arteries are poorly opacified with limited   resolution. Therefore, subsegmental pulmonary embolism difficult to exclude   on this examination due to limited resolution. 2. 7 mm new ground-glass nodule located medially right upper lobe of the   lung. See follow-up guidelines. RECOMMENDATIONS:   Pathology: 7 mm right ground-glass pulmonary nodule within the upper lobe. Recommend a non-contrast Chest CT at 6-12 months to confirm persistence, then   additional non-contrast Chest CTs every 2 years until 5 years. If nodule   grows or develops solid component(s), consider resection.

## 2022-12-08 ENCOUNTER — OFFICE VISIT (OUTPATIENT)
Dept: PULMONOLOGY | Age: 47
End: 2022-12-08

## 2022-12-08 VITALS
OXYGEN SATURATION: 95 % | HEIGHT: 63 IN | SYSTOLIC BLOOD PRESSURE: 130 MMHG | RESPIRATION RATE: 20 BRPM | BODY MASS INDEX: 39.51 KG/M2 | HEART RATE: 94 BPM | DIASTOLIC BLOOD PRESSURE: 92 MMHG | WEIGHT: 223 LBS

## 2022-12-08 DIAGNOSIS — R91.1 PULMONARY NODULE: Primary | ICD-10-CM

## 2022-12-08 DIAGNOSIS — R07.9 CHEST PAIN, UNSPECIFIED TYPE: ICD-10-CM

## 2022-12-08 PROCEDURE — 99203 OFFICE O/P NEW LOW 30 MIN: CPT | Performed by: INTERNAL MEDICINE

## 2022-12-08 NOTE — PROGRESS NOTES
[facility]                                                                    CHIEF COMPLAINT: Lung nodule    Consulting provider: Diana Pepper MD      HPI:   See in ER on 22 for L sided chest pain. Started with URI 22. Whole family got sick. Was given steroid and amoxicillin. She feels better now- 75% better. Some dry cough. Patient has CT chest 2022 for elevated D-dimer. CT chest reviewed by me and showed small right upper lobe groundglass pulmonary nodule. Not sure what makes it better or worse. No associated symptoms. Life long nonsmoker. No history of DVT or PE. Past Medical History:   Diagnosis Date    Anxiety     Fibromyalgia     Headache(784.0)     Hypertension        Past Surgical History:        Procedure Laterality Date     SECTION      OTHER SURGICAL HISTORY Bilateral 10/12/2016    removal bilateral PE tubes with bilateral paper patch     TONSILLECTOMY      TUBAL LIGATION      TYMPANOSTOMY TUBE PLACEMENT      Tubes still in ears, put in when 8 yrs old approx. Allergies:  is allergic to hydroxyzine, lisinopril, sumatriptan, morphine, and paxil [paroxetine hcl]. Social History:    TOBACCO:   reports that she has never smoked. She has never used smokeless tobacco.  ETOH:   reports no history of alcohol use.       Family History:       Problem Relation Age of Onset    High Blood Pressure Father     Heart Disease Father     Asthma Father        Current Medications:    Current Outpatient Medications:     amoxicillin-clavulanate (AUGMENTIN) 875-125 MG per tablet, Take 1 tablet by mouth in the morning and 1 tablet in the evening., Disp: , Rfl:     predniSONE (DELTASONE) 20 MG tablet, Take 60mg by mouth daily x3 days, then 40mg by mouth daily x3 days, then 20mg by mouth daily x3 days, then 10mg by mouth daily x3 days, Disp: , Rfl:     methocarbamol (ROBAXIN) 500 MG tablet, Take 1 tablet by mouth 4 times daily for 20 doses, Disp: 20 tablet, Rfl: 0    hydroxychloroquine (PLAQUENIL) 200 MG tablet, Take by mouth daily, Disp: , Rfl:     sertraline (ZOLOFT) 50 MG tablet, Take 200 mg by mouth daily , Disp: , Rfl:     sulindac (CLINORIL) 200 MG tablet, Take 200 mg by mouth 2 times daily (Patient not taking: No sig reported), Disp: , Rfl:     amphetamine-dextroamphetamine (ADDERALL) 5 MG tablet, Take 25 mg by mouth. , Disp: , Rfl:       REVIEW OF SYSTEMS:  Constitutional: Negative for fever  HENT: Negative for sore throat  Eyes: Negative for redness   Respiratory: +  cough  Cardiovascular: +  chest pain  Gastrointestinal: Negative for vomiting, diarrhea   Genitourinary: Negative for hematuria   Musculoskeletal: Negative for arthralgias   Skin: Negative for rash  Neurological: Negative for syncope  Hematological: Negative for adenopathy  Psychiatric/Behavorial: Negative for anxiety      Objective:   PHYSICAL EXAM:    BP (!) 130/92   Pulse 94   Resp 20   Ht 5' 3\" (1.6 m)   Wt 223 lb (101.2 kg)   SpO2 95% Comment: RA  BMI 39.50 kg/m²  RA  Gen: No distress. Eyes: PERRL. No sclera icterus. No conjunctival injection. ENT: No discharge. Pharynx clear. Neck: Trachea midline. No obvious mass. Resp: No accessory muscle use. No crackles. No wheezes. No rhonchi. No dullness on percussion. Good air entry. CV: Regular rate. Regular rhythm. No murmur or rub. No edema. GI: Non-tender. Non-distended. No hernia. Skin: Warm and dry. No nodule on exposed extremities. Lymph: No cervical LAD. No supraclavicular LAD. M/S: No cyanosis. No joint deformity. No clubbing. Neuro: Awake. Alert. Moves all four extremities. Psych: Oriented x 3. No anxiety. DATA reviewed by me:   CT chest 12/5/22 imaging reviewed by me and showed  1. The central pulmonary arteries are adequately opacified, no large or  central pulmonary embolism noted. No acute segmental pulmonary arterial  embolism.   2. 7 mm new ground-glass nodule located medially right upper lobe of the  lung. Assessment:       RUL 7 mm nodule on CT 12/5/2022. Favor inflammatory/infection. L sided chest pain. Reproducible with palpation. Likely musculoskeletal due to cough from recent URI  RA on Plaquenil and Sulindac    Life long nonsmoker       Plan:       Options of Bx, resection and watchful waiting were discussed with patient.  I recommend radiographic follow up CT chest in 6 months

## 2023-06-09 ENCOUNTER — TELEPHONE (OUTPATIENT)
Dept: PULMONOLOGY | Age: 48
End: 2023-06-09

## 2023-09-26 ENCOUNTER — HOSPITAL ENCOUNTER (OUTPATIENT)
Dept: CT IMAGING | Age: 48
Discharge: HOME | End: 2023-09-26
Attending: INTERNAL MEDICINE
Payer: COMMERCIAL

## 2023-09-26 DIAGNOSIS — R91.1 PULMONARY NODULE: ICD-10-CM

## 2023-09-26 PROCEDURE — 71250 CT THORAX DX C-: CPT

## 2023-11-15 ENCOUNTER — TELEPHONE (OUTPATIENT)
Dept: PULMONOLOGY | Age: 48
End: 2023-11-15

## 2023-11-15 NOTE — TELEPHONE ENCOUNTER
Patient did not show for f/u Ct chest 6mo  appointment  with Dr. Valentine on 11/15/23    Same Day Cancellation: No    Patient rescheduled:  No    New appointment:     Patient was also no show on: n/a    LOV 12/8/22    Assessment:       RUL 7 mm nodule on CT 12/5/2022. Favor inflammatory/infection.   L sided chest pain. Reproducible with palpation. Likely musculoskeletal due to cough from recent URI  RA on Plaquenil and Sulindac    Life long nonsmoker       Plan:       Options of Bx, resection and watchful waiting were discussed with patient. I recommend radiographic follow up CT chest in 6 months

## 2023-12-04 NOTE — TELEPHONE ENCOUNTER
Patient called with message left for patient to call back to office.  Unable to reach patient to tania appt.

## 2024-02-05 ENCOUNTER — APPOINTMENT (OUTPATIENT)
Dept: GENERAL RADIOLOGY | Age: 49
End: 2024-02-05
Payer: COMMERCIAL

## 2024-02-05 ENCOUNTER — HOSPITAL ENCOUNTER (EMERGENCY)
Age: 49
Discharge: HOME | End: 2024-02-05
Payer: COMMERCIAL

## 2024-02-05 VITALS
SYSTOLIC BLOOD PRESSURE: 154 MMHG | HEIGHT: 63 IN | WEIGHT: 215 LBS | BODY MASS INDEX: 38.09 KG/M2 | RESPIRATION RATE: 16 BRPM | TEMPERATURE: 97 F | OXYGEN SATURATION: 99 % | DIASTOLIC BLOOD PRESSURE: 92 MMHG | HEART RATE: 100 BPM

## 2024-02-05 DIAGNOSIS — M79.642 LEFT HAND PAIN: Primary | ICD-10-CM

## 2024-02-05 PROCEDURE — 73130 X-RAY EXAM OF HAND: CPT

## 2024-02-05 PROCEDURE — 6370000000 HC RX 637 (ALT 250 FOR IP): Performed by: PHYSICIAN ASSISTANT

## 2024-02-05 PROCEDURE — 99283 EMERGENCY DEPT VISIT LOW MDM: CPT

## 2024-02-05 RX ORDER — HYDROCODONE BITARTRATE AND ACETAMINOPHEN 5; 325 MG/1; MG/1
1 TABLET ORAL EVERY 6 HOURS PRN
Qty: 8 TABLET | Refills: 0 | Status: SHIPPED | OUTPATIENT
Start: 2024-02-05 | End: 2024-02-08

## 2024-02-05 RX ORDER — OXYCODONE HYDROCHLORIDE AND ACETAMINOPHEN 5; 325 MG/1; MG/1
1 TABLET ORAL ONCE
Status: COMPLETED | OUTPATIENT
Start: 2024-02-05 | End: 2024-02-05

## 2024-02-05 RX ORDER — NAPROXEN 500 MG/1
500 TABLET ORAL ONCE
Status: COMPLETED | OUTPATIENT
Start: 2024-02-05 | End: 2024-02-05

## 2024-02-05 RX ORDER — PREDNISONE 50 MG/1
50 TABLET ORAL DAILY
Qty: 5 TABLET | Refills: 0 | Status: SHIPPED | OUTPATIENT
Start: 2024-02-05 | End: 2024-02-10

## 2024-02-05 RX ADMIN — OXYCODONE AND ACETAMINOPHEN 1 TABLET: 5; 325 TABLET ORAL at 21:22

## 2024-02-05 RX ADMIN — NAPROXEN 500 MG: 500 TABLET ORAL at 21:22

## 2024-02-05 ASSESSMENT — PAIN DESCRIPTION - PAIN TYPE: TYPE: ACUTE PAIN

## 2024-02-05 ASSESSMENT — PAIN DESCRIPTION - LOCATION
LOCATION: HAND
LOCATION: HAND

## 2024-02-05 ASSESSMENT — PAIN DESCRIPTION - ORIENTATION
ORIENTATION: LEFT
ORIENTATION: LEFT

## 2024-02-05 ASSESSMENT — PAIN SCALES - GENERAL
PAINLEVEL_OUTOF10: 10
PAINLEVEL_OUTOF10: 10

## 2024-02-05 ASSESSMENT — PAIN DESCRIPTION - DESCRIPTORS: DESCRIPTORS: SHARP;THROBBING;SHOOTING

## 2024-02-05 ASSESSMENT — PAIN - FUNCTIONAL ASSESSMENT: PAIN_FUNCTIONAL_ASSESSMENT: 0-10

## 2024-02-06 NOTE — ED PROVIDER NOTES
or the family were informed of the results of any tests, a time was given to answer questions, a plan was proposed and they agreed with plan.    I am the Primary Clinician of Record.  FINAL IMPRESSION      1. Left hand pain          DISPOSITION/PLAN     DISPOSITION Decision To Discharge 02/05/2024 10:57:59 PM      PATIENT REFERRED TO:  Hardeep Watters MD  [address]  [facility] [address]  [phone]    In 3 days        DISCHARGE MEDICATIONS:  Discharge Medication List as of 2/5/2024 11:02 PM        START taking these medications    Details   HYDROcodone-acetaminophen (NORCO) 5-325 MG per tablet Take 1 tablet by mouth every 6 hours as needed for Pain for up to 3 days. Intended supply: 3 days. Take lowest dose possible to manage pain Max Daily Amount: 4 tablets, Disp-8 tablet, R-0Normal      predniSONE (DELTASONE) 50 MG tablet Take 1 tablet by mouth daily for 5 days, Disp-5 tablet, R-0Normal             DISCONTINUED MEDICATIONS:  Discharge Medication List as of 2/5/2024 11:02 PM                 (Please note that portions of this note were completed with a voice recognition program.  Efforts were made to edit the dictations but occasionally words are mis-transcribed.)    Astrid Guo PA-C (electronically signed)       Astrid Guo PA-C  02/05/24 [id number]

## 2025-02-18 NOTE — PROGRESS NOTES
Courtney E Kelvin    Age 50 y.o.    female    1975    MRN [id number]    2/25/2025  Arrival Time_____________  OR Time____________30 Min     Procedure(s):  COLONOSCOPY                      Monitor Anesthesia Care    Surgeon(s):  JakeKrista tavares, MD       Phone [phone] (home) [phone] (work)    [facility]  Date  Info Source  Home  Cell         Work  _____________________________________________________________________  _____________________________________________________________________  _____________________________________________________________________  _____________________________________________________________________  _____________________________________________________________________    PCP _____________________________ Phone_________________     H&P  ________________  Bringing      Chart              Epic      DOS      Called________  EKG ________________   Bringing      Chart              Epic      DOS      Called________  LABS________________   Bringing     Chart              Epic      DOS      Called________  Cardiac Clearance ______ Bringing      Chart              Epic      DOS      Called________  Pulmonary Clearance____ Bringing      Chart              Epic      DOS      Called________    Cardiologist________________________ Phone___________________________  Pulmonologist_______________________Phone___________________________    ? Advance Directives   ? Latter day concerns / Waiver on Chart            PAT Communications________________  ? Pre-op Instructions Given /Understood          _________________________________  ? Directions to Surgery Center                          _________________________________  ? Transportation Home_______________      __________________________________  ? Crutches/Walker__________________        __________________________________    Orders: Hard copy/ EPIC                 Transcribed/ EPIC              _______Wt.    ________Pharmacy

## 2025-02-20 RX ORDER — CEFDINIR 300 MG/1
300 CAPSULE ORAL 2 TIMES DAILY
COMMUNITY

## 2025-02-20 RX ORDER — ATORVASTATIN CALCIUM 20 MG/1
10 TABLET, FILM COATED ORAL DAILY
COMMUNITY

## 2025-02-20 NOTE — PROGRESS NOTES
Date and time of surgery :    2/25/25          Arrival Time:  1000     Bring Picture ID and insurance card.  Please wear simple, loose fitting clothing to the hospital.   Do not bring valuables (money, credit cards, checkbooks, etc.)   Do not wear any makeup (including  eye makeup) and no nail polish or artificial nails on your fingers or toes.  DO NOT wear any jewelry or piercings on day of surgery.  All body piercing jewelry must be removed.  If you have dentures, they will be removed before going to the OR; we will provide you a container.  If you wear contact lenses or glasses, they will be removed; please bring a case for them.  Shower the evening before or morning of surgery   Nothing to eat or drink after midnight the day before surgery except what Dr Joseph instructs you to take for your colonoscopy prep  You may brush your teeth and gargle the morning of surgery.  DO NOT SWALLOW WATER.   Do not take any morning meds the day of your surgery except what was instructed per anesthesia medication management protocol   Aspirin, Ibuprofen, Advil, Naproxen, Vitamin E and other Anti-inflammatory products and supplements should be stopped for 5 -7days before surgery or as directed by your physician.  Do not smoke or drink any alcoholic beverages 24 hours prior to surgery.  This includes NA Beer. Refrain from the usage of any recreational drugs, including non-prescribed prescription drugs.   You MUST plan for a responsible adult to stay on site while you are here and take you home after your surgery. You will not be allowed to leave alone or drive yourself home. It is strongly suggested someone stay with you the first 24 hrs. Your surgery will be cancelled if you do not have a ride home.    If you  have a Living Will and Durable Power of  for Healthcare, please bring in a copy.  Notify your Surgeon if you develop any illness between now and time of surgery. Cough, cold, fever, sore throat, nausea, vomiting,

## 2025-02-25 ENCOUNTER — ANESTHESIA (OUTPATIENT)
Dept: ENDOSCOPY | Age: 50
End: 2025-02-25
Payer: COMMERCIAL

## 2025-02-25 ENCOUNTER — ANESTHESIA EVENT (OUTPATIENT)
Dept: ENDOSCOPY | Age: 50
End: 2025-02-25
Payer: COMMERCIAL

## 2025-02-25 ENCOUNTER — HOSPITAL ENCOUNTER (OUTPATIENT)
Age: 50
Setting detail: OUTPATIENT SURGERY
Discharge: HOME | End: 2025-02-25
Attending: INTERNAL MEDICINE | Admitting: INTERNAL MEDICINE
Payer: COMMERCIAL

## 2025-02-25 VITALS
HEIGHT: 63 IN | OXYGEN SATURATION: 97 % | TEMPERATURE: 98.2 F | DIASTOLIC BLOOD PRESSURE: 66 MMHG | SYSTOLIC BLOOD PRESSURE: 127 MMHG | RESPIRATION RATE: 16 BRPM | BODY MASS INDEX: 39.87 KG/M2 | HEART RATE: 65 BPM | WEIGHT: 225 LBS

## 2025-02-25 DIAGNOSIS — Z12.11 COLON CANCER SCREENING: ICD-10-CM

## 2025-02-25 LAB — HCG UR QL: NEGATIVE

## 2025-02-25 PROCEDURE — 3609010600 HC COLONOSCOPY POLYPECTOMY SNARE/COLD BIOPSY: Performed by: INTERNAL MEDICINE

## 2025-02-25 PROCEDURE — 3700000000 HC ANESTHESIA ATTENDED CARE: Performed by: INTERNAL MEDICINE

## 2025-02-25 PROCEDURE — 84703 CHORIONIC GONADOTROPIN ASSAY: CPT

## 2025-02-25 PROCEDURE — 6360000002 HC RX W HCPCS: Performed by: NURSE ANESTHETIST, CERTIFIED REGISTERED

## 2025-02-25 PROCEDURE — 7100000011 HC PHASE II RECOVERY - ADDTL 15 MIN: Performed by: INTERNAL MEDICINE

## 2025-02-25 PROCEDURE — 7100000010 HC PHASE II RECOVERY - FIRST 15 MIN: Performed by: INTERNAL MEDICINE

## 2025-02-25 PROCEDURE — 2709999900 HC NON-CHARGEABLE SUPPLY: Performed by: INTERNAL MEDICINE

## 2025-02-25 PROCEDURE — 88305 TISSUE EXAM BY PATHOLOGIST: CPT

## 2025-02-25 PROCEDURE — 3700000001 HC ADD 15 MINUTES (ANESTHESIA): Performed by: INTERNAL MEDICINE

## 2025-02-25 RX ORDER — SODIUM CHLORIDE 0.9 % (FLUSH) 0.9 %
5-40 SYRINGE (ML) INJECTION EVERY 12 HOURS SCHEDULED
Status: DISCONTINUED | OUTPATIENT
Start: 2025-02-25 | End: 2025-02-25 | Stop reason: HOSPADM

## 2025-02-25 RX ORDER — SODIUM CHLORIDE 9 MG/ML
INJECTION, SOLUTION INTRAVENOUS PRN
Status: DISCONTINUED | OUTPATIENT
Start: 2025-02-25 | End: 2025-02-25 | Stop reason: HOSPADM

## 2025-02-25 RX ORDER — LIDOCAINE HYDROCHLORIDE 10 MG/ML
0.3 INJECTION, SOLUTION EPIDURAL; INFILTRATION; INTRACAUDAL; PERINEURAL
Status: DISCONTINUED | OUTPATIENT
Start: 2025-02-25 | End: 2025-02-25 | Stop reason: HOSPADM

## 2025-02-25 RX ORDER — SODIUM CHLORIDE 0.9 % (FLUSH) 0.9 %
5-40 SYRINGE (ML) INJECTION PRN
Status: DISCONTINUED | OUTPATIENT
Start: 2025-02-25 | End: 2025-02-25 | Stop reason: HOSPADM

## 2025-02-25 RX ORDER — SODIUM CHLORIDE, SODIUM LACTATE, POTASSIUM CHLORIDE, CALCIUM CHLORIDE 600; 310; 30; 20 MG/100ML; MG/100ML; MG/100ML; MG/100ML
INJECTION, SOLUTION INTRAVENOUS CONTINUOUS
Status: DISCONTINUED | OUTPATIENT
Start: 2025-02-25 | End: 2025-02-25 | Stop reason: HOSPADM

## 2025-02-25 RX ORDER — PROPOFOL 10 MG/ML
INJECTION, EMULSION INTRAVENOUS
Status: DISCONTINUED | OUTPATIENT
Start: 2025-02-25 | End: 2025-02-25 | Stop reason: SDUPTHER

## 2025-02-25 RX ADMIN — PROPOFOL 150 MCG/KG/MIN: 10 INJECTION, EMULSION INTRAVENOUS at 10:51

## 2025-02-25 RX ADMIN — PROPOFOL 100 MG: 10 INJECTION, EMULSION INTRAVENOUS at 10:50

## 2025-02-25 ASSESSMENT — PAIN SCALES - GENERAL
PAINLEVEL_OUTOF10: 0

## 2025-02-25 ASSESSMENT — PAIN - FUNCTIONAL ASSESSMENT: PAIN_FUNCTIONAL_ASSESSMENT: 0-10

## 2025-02-25 NOTE — H&P
Gastroenterology Note             Pre-operative History and Physical    Patient: Courtney Warren  : 1975  CSN:     History Obtained From:  patient and/or guardian.     HISTORY OF PRESENT ILLNESS:    The patient is a 50 y.o. female  here for colonoscopy.     Past Medical History:    Past Medical History:   Diagnosis Date    Anxiety     Fibromyalgia     Headache(784.0)     Hypertension      Past Surgical History:    Past Surgical History:   Procedure Laterality Date     SECTION      OTHER SURGICAL HISTORY Bilateral 10/12/2016    removal bilateral PE tubes with bilateral paper patch     TONSILLECTOMY      TUBAL LIGATION      TYMPANOSTOMY TUBE PLACEMENT      Tubes still in ears, put in when 10 yrs old approx.     Medications Prior to Admission:   No current facility-administered medications on file prior to encounter.     Current Outpatient Medications on File Prior to Encounter   Medication Sig Dispense Refill    FLUoxetine (PROZAC) 20 MG capsule Take 3 capsules by mouth daily      atorvastatin (LIPITOR) 20 MG tablet Take 0.5 tablets by mouth daily      cefdinir (OMNICEF) 300 MG capsule Take 1 capsule by mouth 2 times daily For 7 more days      hydroxychloroquine (PLAQUENIL) 200 MG tablet Take by mouth daily      sulindac (CLINORIL) 200 MG tablet Take 1 tablet by mouth 2 times daily      amphetamine-dextroamphetamine (ADDERALL) 5 MG tablet Take 5 tablets by mouth daily. As needed in pm          Allergies:  Hydroxyzine, Lisinopril, Sumatriptan, Morphine, and Paxil [paroxetine hcl]      Social History:   Social History     Tobacco Use    Smoking status: Never    Smokeless tobacco: Never   Substance Use Topics    Alcohol use: No     Family History:   Family History   Problem Relation Age of Onset    High Blood Pressure Father     Heart Disease Father     Asthma Father        PHYSICAL EXAM:      BP (!) 164/85   Pulse 75   Temp 98.2 °F (36.8 °C) (Oral)   Resp 16   Ht 1.6 m (5' 3\")   Wt 102.1 kg

## 2025-02-25 NOTE — PROGRESS NOTES
Patient admitted to pre-op bay 11 in preparation for surgery, VSS. Consent confirmed. IV inserted into left hand, NS infusing. Belongings under stretcher. NPO since 2330. Family at bedside, call light within reach.

## 2025-02-25 NOTE — ANESTHESIA POSTPROCEDURE EVALUATION
Department of Anesthesiology  Postprocedure Note    Patient: Courtney Warren  MRN: [id number]  YOB: 1975  Date of evaluation: 2/25/2025    Procedure Summary       Date: 02/25/25 Room / Location: [place] [address] / [facility]    Anesthesia Start: 1046 Anesthesia Stop: 1115    Procedure: COLONOSCOPY POLYPECTOMY SNARE/BIOPSY Diagnosis:       Colon cancer screening      (Colon cancer screening [Z12.11])    Surgeons: Krista Joseph MD Responsible Provider: Laureano Alvarado MD    Anesthesia Type: MAC ASA Status: 3            Anesthesia Type: No value filed.    Britney Phase I: Britney Score: 10    Britney Phase II: Britney Score: 9    Anesthesia Post Evaluation    Patient location during evaluation: PACU  Patient participation: complete - patient participated  Level of consciousness: awake and alert  Pain score: 0  Airway patency: patent  Nausea & Vomiting: no nausea and no vomiting  Cardiovascular status: blood pressure returned to baseline  Respiratory status: acceptable  Hydration status: stable  Pain management: adequate    No notable events documented.

## 2025-02-25 NOTE — DISCHARGE INSTRUCTIONS
PATIENT INSTRUCTIONS  POST-SEDATION          IMMEDIATELY FOLLOWING PROCEDURE:    Do not drive or operate machinery for the first twenty four hours after surgery.     Do not make any important decisions for twenty four hours after surgery or while taking narcotic pain medications or sedatives.     You should NOT BE LEFT UNATTENDED OR ALONE. A responsible adult should be with you for the rest of the day of your procedure and also during the night for your protection and safety.    You may experience some light headedness. Rest at home with activity as tolerated. You may not need to go to bed, but it is important to rest for the next 24 hours. You should not engage in athletic sports such as basketball, volleyball, jogging, skating, or activities requiring refined motor skills for 24 hours.   If you develop intractable nausea and vomiting or a severe headache please notify your doctor immediately.   You are not expected to have any fever, but if you feel warm, take your temperature. If you have a fever 101 degrees or higher, call your doctor.     If you have had an Endoscopy:   *Eat lightly for your first meal and gradually resume your normal / prescribed diet.    *If you have had a colonoscopy, do not expect a normal bowel movement for approximately three days due to the cleansing of the large intestine prior to colonoscopy.    ONCE YOU ARE HOME, IF YOU SHOULD HAVE:  Difficulty in breathing, persistent nausea or vomiting, bleeding you feel is excessive, or pain that is unusual, increased abdominal bloating, or any swelling, fever / chills, call your physician. If you cannot contact your physician, but feel that your signs and symptoms need a physician's attention, go to the Emergency Department.      FOLLOW-UP:    Please follow up with your Primary Care Provider as scheduled or needed.    Call Krista Alvarez MD if there are any GI concerns. [phone]    Repeat Colonoscopy pending pathology.    You may be

## 2025-02-25 NOTE — ANESTHESIA PRE PROCEDURE
Department of Anesthesiology  Preprocedure Note       Name:  Courtney Warren   Age:  50 y.o.  :  1975                                          MRN:  [id number]         Date:  2025      Surgeon: Surgeon(s):  Krista Joseph MD    Procedure: Procedure(s):  COLONOSCOPY    Medications prior to admission:   Prior to Admission medications    Medication Sig Start Date End Date Taking? Authorizing Provider   FLUoxetine (PROZAC) 20 MG capsule Take 3 capsules by mouth daily   Yes Umang Washington MD   atorvastatin (LIPITOR) 20 MG tablet Take 0.5 tablets by mouth daily   Yes Umang Washington MD   cefdinir (OMNICEF) 300 MG capsule Take 1 capsule by mouth 2 times daily For 7 more days   Yes Umang Washington MD   hydroxychloroquine (PLAQUENIL) 200 MG tablet Take by mouth daily   Yes Umang Washington MD   sulindac (CLINORIL) 200 MG tablet Take 1 tablet by mouth 2 times daily   Yes Umang Washington MD   amphetamine-dextroamphetamine (ADDERALL) 5 MG tablet Take 5 tablets by mouth daily. As needed in pm 20   Umang Washington MD       Current medications:    Current Facility-Administered Medications   Medication Dose Route Frequency Provider Last Rate Last Admin   • lidocaine PF 1 % injection 0.3 mL  0.3 mL IntraDERmal Once PRN Griffin Balbuena MD       • lactated ringers infusion   IntraVENous Continuous Griffin Balbuena MD       • sodium chloride flush 0.9 % injection 5-40 mL  5-40 mL IntraVENous 2 times per day Griffin Balbuena MD       • sodium chloride flush 0.9 % injection 5-40 mL  5-40 mL IntraVENous PRN Griffin Balbuena MD       • 0.9 % sodium chloride infusion   IntraVENous PRN Griffin Balbuena MD           Allergies:    Allergies   Allergen Reactions   • Hydroxyzine Other (See Comments)     Gave her migraines   • Lisinopril Other (See Comments)   • Sumatriptan Other (See Comments)     Says it did not work   • Morphine Nausea And Vomiting   • Paxil [Paroxetine Hcl] Anxiety

## 2025-03-10 ENCOUNTER — HOSPITAL ENCOUNTER (OUTPATIENT)
Age: 50
Discharge: HOME | End: 2025-03-10
Payer: COMMERCIAL

## 2025-03-10 ENCOUNTER — HOSPITAL ENCOUNTER (OUTPATIENT)
Dept: GENERAL RADIOLOGY | Age: 50
Discharge: HOME | End: 2025-03-10
Payer: COMMERCIAL

## 2025-03-10 DIAGNOSIS — R05.1 ACUTE COUGH: ICD-10-CM

## 2025-03-10 PROCEDURE — 71046 X-RAY EXAM CHEST 2 VIEWS: CPT

## 2025-03-15 ENCOUNTER — HOSPITAL ENCOUNTER (EMERGENCY)
Age: 50
Discharge: HOME | End: 2025-03-15
Attending: EMERGENCY MEDICINE
Payer: COMMERCIAL

## 2025-03-15 ENCOUNTER — APPOINTMENT (OUTPATIENT)
Dept: GENERAL RADIOLOGY | Age: 50
End: 2025-03-15
Payer: COMMERCIAL

## 2025-03-15 VITALS
BODY MASS INDEX: 40.03 KG/M2 | HEART RATE: 87 BPM | SYSTOLIC BLOOD PRESSURE: 166 MMHG | RESPIRATION RATE: 16 BRPM | TEMPERATURE: 98.4 F | WEIGHT: 226 LBS | OXYGEN SATURATION: 97 % | DIASTOLIC BLOOD PRESSURE: 104 MMHG

## 2025-03-15 DIAGNOSIS — R07.81 RIB PAIN ON LEFT SIDE: Primary | ICD-10-CM

## 2025-03-15 LAB
EKG ATRIAL RATE: 91 BPM
EKG DIAGNOSIS: NORMAL
EKG P AXIS: 63 DEGREES
EKG P-R INTERVAL: 160 MS
EKG Q-T INTERVAL: 366 MS
EKG QRS DURATION: 78 MS
EKG QTC CALCULATION (BAZETT): 450 MS
EKG R AXIS: 41 DEGREES
EKG T AXIS: 47 DEGREES
EKG VENTRICULAR RATE: 91 BPM

## 2025-03-15 PROCEDURE — 99284 EMERGENCY DEPT VISIT MOD MDM: CPT

## 2025-03-15 PROCEDURE — 71101 X-RAY EXAM UNILAT RIBS/CHEST: CPT

## 2025-03-15 PROCEDURE — 93005 ELECTROCARDIOGRAM TRACING: CPT | Performed by: EMERGENCY MEDICINE

## 2025-03-15 ASSESSMENT — PAIN - FUNCTIONAL ASSESSMENT: PAIN_FUNCTIONAL_ASSESSMENT: 0-10

## 2025-03-15 ASSESSMENT — PAIN DESCRIPTION - LOCATION: LOCATION: RIB CAGE

## 2025-03-15 ASSESSMENT — PAIN DESCRIPTION - ORIENTATION: ORIENTATION: LEFT

## 2025-03-15 ASSESSMENT — LIFESTYLE VARIABLES
HOW OFTEN DO YOU HAVE A DRINK CONTAINING ALCOHOL: NEVER
HOW MANY STANDARD DRINKS CONTAINING ALCOHOL DO YOU HAVE ON A TYPICAL DAY: PATIENT DOES NOT DRINK

## 2025-03-15 ASSESSMENT — PAIN SCALES - GENERAL: PAINLEVEL_OUTOF10: 6

## 2025-03-15 NOTE — ED PROVIDER NOTES
deficit.      Motor: No weakness.      Gait: Gait normal.   Psychiatric:         Mood and Affect: Mood normal.            Data Reviewed and Complexity    External Documents Reviewed (Source, Time, Date):       DIAGNOSTIC RESULTS       My EKG interpretation: Performed at 8:08 AM-the ventricular rate is 91, MT interval 0.160, QRS duration 0.078, QTc corrected point 450 there is no ST segment elevation no dysrhythmia  EKG: All EKG's are interpreted by the Emergency Department Physician who either signs or Co-signs this chart in the   absence of a cardiologist.    Imaging that is independently reviewed and interpreted by me as: No pneumothorax no obvious rib fractures interpreted myself confirmed by the radiologist read  XR RIBS LEFT INCLUDE CHEST (MIN 3 VIEWS)   Final Result   1. No acute pulmonary finding.   2. Subtle deformity of the left 10th rib which may represent a remote healed   fracture. No definite acute fracture.           Laboratory studies   Labs Reviewed - No data to display    EKG, Imaging and Labs have christine reviewed and discussed in depth with the patient.    Tests considered but not ordered and why: D-dimer/abdominal investigative workup/troponin considered however not indicated based on patient's presentation and evaluation      SCREENINGS                              Is this patient to be included in the SEP-1 Core Measure due to severe sepsis or septic shock?   No   Exclusion criteria - the patient is NOT to be included for SEP-1 Core Measure due to:  2+ SIRS criteria are not met          See more data below for the lab and radiology tests and orders.    Treatment and Disposition    ED Triage Vitals   BP Systolic BP Percentile Diastolic BP Percentile Temp Temp src Pulse Respirations SpO2   03/15/25 0809 -- -- 03/15/25 0809 -- 03/15/25 0809 03/15/25 0809 03/15/25 0809   (!) 166/104   98.4 °F (36.9 °C)  87 16 97 %      Height Weight - Scale         -- 03/15/25 0808          102.5 kg (226 lb)              Patient repeat assessment:    (Vitals, medications/route - ED & prescription)    Emergency Department Course     Medical Decision Making  50-year-old female presents emergency department with concerns of left rib pain.  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  This occurred Thursday 3 days prior to ED arrival.  She relates she felt a pop in her left ribs at that time.  Patient's discomfort is made worse with any movement.  Denies loss of consciousness denies any history for coronary disease pulmonary emboli.  Denies any abdominal discomfort.    Diagnostic considerations left rib strain/left rib fracture/cartilage injury left ribs    Diagnostic considerations however likely pulmonary emboli/ACS syndrome/pneumothorax    Patient prefers no pain medication upon arrival    X-ray of the patient's left ribs no definitive acute fracture however there is subtle deformity left rib #10 which may represent remote healed fracture there are no pulmonary findings no pneumothorax-this interpretation was done by radiologist confirmed by myself and reviewed in depth in detail with the patient    There are no red flags    Patient's PERC score is 0    Patient will be instructed on incentive spirometer open she is encouraged to utilize pending recheck further recommendations by her healthcare provider        Amount and/or Complexity of Data Reviewed  Radiology: ordered.  ECG/medicine tests: ordered.          Shared Decision Making--undertaken with the patient considering investigative work-up treatment disposition follow-up of which the patient also concurs           Patient repeat assessment:    (Vitals, medications/route - ED & prescription)    Patient remains quite comfortable well oxidated during emergency department course    Patient/Family acknowledges discharge/final diagnosis (left rib strain/contusion) the importance of timely follow-up (24 to 48 hours) with strict return precautions

## 2025-03-15 NOTE — DISCHARGE INSTRUCTIONS
Please continue with medications as previously advised and prescribed    Please also utilize incentive spirometer as recommended

## (undated) DEVICE — ENDOSCOPIC KIT 2 12 FT OP4 DE2 GWN SYR

## (undated) DEVICE — SNARE ENDOSCP L240CM SHTH DIA24MM LOOP W10MM POLYP RND REINF

## (undated) DEVICE — ELECTRODE,ECG,STRESS,FOAM,3PK: Brand: MEDLINE

## (undated) DEVICE — CANNULA NSL AD TBNG L7FT PVC STR NONFLARED PRNG O2 DEL W STD

## (undated) DEVICE — TRAP SPEC POLYPR SGL CHMBR FN MESH SCRN